# Patient Record
Sex: MALE | Race: WHITE | HISPANIC OR LATINO | URBAN - METROPOLITAN AREA
[De-identification: names, ages, dates, MRNs, and addresses within clinical notes are randomized per-mention and may not be internally consistent; named-entity substitution may affect disease eponyms.]

---

## 2023-08-10 ENCOUNTER — TELEPHONE ENCOUNTER (OUTPATIENT)
Dept: URBAN - METROPOLITAN AREA CLINIC 23 | Facility: CLINIC | Age: 53
End: 2023-08-10

## 2023-08-29 ENCOUNTER — OFFICE VISIT (OUTPATIENT)
Dept: URBAN - METROPOLITAN AREA CLINIC 23 | Facility: CLINIC | Age: 53
End: 2023-08-29
Payer: COMMERCIAL

## 2023-08-29 ENCOUNTER — LAB OUTSIDE AN ENCOUNTER (OUTPATIENT)
Dept: URBAN - METROPOLITAN AREA CLINIC 23 | Facility: CLINIC | Age: 53
End: 2023-08-29

## 2023-08-29 VITALS
TEMPERATURE: 97.5 F | BODY MASS INDEX: 36.7 KG/M2 | SYSTOLIC BLOOD PRESSURE: 134 MMHG | HEIGHT: 67 IN | WEIGHT: 233.8 LBS | HEART RATE: 69 BPM | DIASTOLIC BLOOD PRESSURE: 88 MMHG

## 2023-08-29 DIAGNOSIS — R19.5 POSITIVE FIT (FECAL IMMUNOCHEMICAL TEST): ICD-10-CM

## 2023-08-29 DIAGNOSIS — Z86.010 HISTORY OF ADENOMATOUS POLYP OF COLON: ICD-10-CM

## 2023-08-29 DIAGNOSIS — E66.9 OBESITY: ICD-10-CM

## 2023-08-29 PROCEDURE — 99204 OFFICE O/P NEW MOD 45 MIN: CPT | Performed by: INTERNAL MEDICINE

## 2023-08-29 PROCEDURE — 99244 OFF/OP CNSLTJ NEW/EST MOD 40: CPT | Performed by: INTERNAL MEDICINE

## 2023-08-29 NOTE — HPI-TODAY'S VISIT:
- 52 yo male of  descent ([place]), referred by Dr. Dl Chan for surveillance of adenomatous colon polyps.  A copy of this note will be sent to the referring physician. - Patient has a history of adenomatous colon polyps.  I performed his last colonoscopy in 2019 and I had recommended a surveillance colonoscopy in 3 years so he is overdue. - Patient also states that his PCP recently did a fecal immunochemical test (FIT) and it came back positive.  I did note hemorrhoids on his previous colonoscopy in 2019.  But other possibilities need to be ruled out. - Denies hematochezia, change in bowel habits, or weight loss - Denies family history of GI malignancies in any first degree relatives - Denies heartburn symptoms or history of chronic GERD

## 2023-09-08 ENCOUNTER — OFFICE VISIT (OUTPATIENT)
Dept: URBAN - METROPOLITAN AREA SURGERY CENTER 15 | Facility: SURGERY CENTER | Age: 53
End: 2023-09-08
Payer: COMMERCIAL

## 2023-09-08 ENCOUNTER — CLAIMS CREATED FROM THE CLAIM WINDOW (OUTPATIENT)
Dept: URBAN - METROPOLITAN AREA CLINIC 4 | Facility: CLINIC | Age: 53
End: 2023-09-08
Payer: COMMERCIAL

## 2023-09-08 DIAGNOSIS — R19.5 ABNORMAL CONSISTENCY OF STOOL: ICD-10-CM

## 2023-09-08 DIAGNOSIS — K63.89 APPENDICITIS EPIPLOICA: ICD-10-CM

## 2023-09-08 DIAGNOSIS — K63.5 BENIGN COLON POLYP: ICD-10-CM

## 2023-09-08 DIAGNOSIS — Z86.010 HISTORY OF ADENOMATOUS POLYP OF COLON: ICD-10-CM

## 2023-09-08 DIAGNOSIS — K62.1 ANAL AND RECTAL POLYP: ICD-10-CM

## 2023-09-08 DIAGNOSIS — K63.89 OTHER SPECIFIED DISEASES OF INTESTINE: ICD-10-CM

## 2023-09-08 DIAGNOSIS — K64.8 INTERNAL HEMORRHOIDS: ICD-10-CM

## 2023-09-08 DIAGNOSIS — K63.5 BENIGN COLON POLYPS: ICD-10-CM

## 2023-09-08 DIAGNOSIS — R19.5 POSITIVE FIT (FECAL IMMUNOCHEMICAL TEST): ICD-10-CM

## 2023-09-08 PROCEDURE — G8907 PT DOC NO EVENTS ON DISCHARG: HCPCS | Performed by: INTERNAL MEDICINE

## 2023-09-08 PROCEDURE — 88305 TISSUE EXAM BY PATHOLOGIST: CPT | Performed by: PATHOLOGY

## 2023-09-08 PROCEDURE — 00811 ANES LWR INTST NDSC NOS: CPT | Performed by: NURSE ANESTHETIST, CERTIFIED REGISTERED

## 2023-09-08 PROCEDURE — 45380 COLONOSCOPY AND BIOPSY: CPT | Performed by: INTERNAL MEDICINE

## 2023-09-08 PROCEDURE — 45385 COLONOSCOPY W/LESION REMOVAL: CPT | Performed by: INTERNAL MEDICINE

## 2023-09-08 PROCEDURE — 45384 COLONOSCOPY W/LESION REMOVAL: CPT | Performed by: INTERNAL MEDICINE

## 2023-09-26 ENCOUNTER — DASHBOARD ENCOUNTERS (OUTPATIENT)
Age: 53
End: 2023-09-26

## 2024-08-05 ENCOUNTER — WEB ENCOUNTER (OUTPATIENT)
Dept: URBAN - METROPOLITAN AREA CLINIC 111 | Facility: CLINIC | Age: 54
End: 2024-08-05

## 2024-08-08 ENCOUNTER — OFFICE VISIT (OUTPATIENT)
Dept: URBAN - METROPOLITAN AREA CLINIC 111 | Facility: CLINIC | Age: 54
End: 2024-08-08

## 2024-08-08 VITALS
TEMPERATURE: 98.1 F | BODY MASS INDEX: 36.7 KG/M2 | WEIGHT: 233.8 LBS | SYSTOLIC BLOOD PRESSURE: 125 MMHG | HEART RATE: 71 BPM | HEIGHT: 67 IN | DIASTOLIC BLOOD PRESSURE: 86 MMHG

## 2024-08-08 DIAGNOSIS — Z86.010 HISTORY OF ADENOMATOUS POLYP OF COLON: ICD-10-CM

## 2024-08-08 DIAGNOSIS — R19.5 POSITIVE FECAL OCCULT BLOOD TEST: ICD-10-CM

## 2024-08-08 PROCEDURE — 99213 OFFICE O/P EST LOW 20 MIN: CPT | Performed by: PHYSICIAN ASSISTANT

## 2024-08-08 NOTE — HPI-TODAY'S VISIT:
55 y/o male here with positive FOBT. S/p colonoscopy in 9/2023 by Dr. Osuna for positive FIT test and h/o adenomatous polyps revealing 3 polyps and external/internal hemorrhoids. Path revealing all hyperplastic polyps. Repeat colon recommended in 2026. Pt had multiple adenomatous polyps in 2019.  Pt is here with his wife. No symptoms. No rectal bleeding. Pt is having stool regularly. He had some constipation a month ago from enlarged prostate but it improved after starting Flomax. No anemia.